# Patient Record
Sex: FEMALE | Race: WHITE | NOT HISPANIC OR LATINO | Employment: OTHER | URBAN - METROPOLITAN AREA
[De-identification: names, ages, dates, MRNs, and addresses within clinical notes are randomized per-mention and may not be internally consistent; named-entity substitution may affect disease eponyms.]

---

## 2017-03-01 ENCOUNTER — ALLSCRIPTS OFFICE VISIT (OUTPATIENT)
Dept: OTHER | Facility: OTHER | Age: 77
End: 2017-03-01

## 2017-09-11 ENCOUNTER — ALLSCRIPTS OFFICE VISIT (OUTPATIENT)
Dept: OTHER | Facility: OTHER | Age: 77
End: 2017-09-11

## 2017-12-08 ENCOUNTER — GENERIC CONVERSION - ENCOUNTER (OUTPATIENT)
Dept: CARDIOLOGY CLINIC | Facility: CLINIC | Age: 77
End: 2017-12-08

## 2018-01-14 VITALS
SYSTOLIC BLOOD PRESSURE: 120 MMHG | WEIGHT: 214 LBS | HEART RATE: 92 BPM | DIASTOLIC BLOOD PRESSURE: 80 MMHG | BODY MASS INDEX: 33.59 KG/M2 | OXYGEN SATURATION: 94 % | HEIGHT: 67 IN

## 2018-01-14 VITALS
DIASTOLIC BLOOD PRESSURE: 80 MMHG | SYSTOLIC BLOOD PRESSURE: 142 MMHG | OXYGEN SATURATION: 97 % | HEART RATE: 84 BPM | BODY MASS INDEX: 33.59 KG/M2 | HEIGHT: 67 IN | WEIGHT: 214 LBS

## 2018-05-18 RX ORDER — ASPIRIN 81 MG/1
TABLET ORAL DAILY
COMMUNITY
Start: 2014-06-13

## 2018-05-18 RX ORDER — OXYBUTYNIN CHLORIDE 5 MG/1
5 TABLET ORAL 3 TIMES DAILY
Refills: 11 | COMMUNITY
Start: 2018-04-21

## 2018-05-18 RX ORDER — AMLODIPINE BESYLATE 5 MG/1
5 TABLET ORAL DAILY
Refills: 1 | COMMUNITY
Start: 2018-04-23

## 2018-05-18 RX ORDER — PRAVASTATIN SODIUM 20 MG
20 TABLET ORAL DAILY
Refills: 0 | COMMUNITY
Start: 2018-05-09 | End: 2021-11-02 | Stop reason: SDUPTHER

## 2018-05-18 RX ORDER — LEVETIRACETAM 500 MG/1
500 TABLET ORAL 2 TIMES DAILY
Refills: 5 | COMMUNITY
Start: 2018-05-09

## 2018-05-18 RX ORDER — LEVOTHYROXINE SODIUM 0.1 MG/1
TABLET ORAL
Refills: 1 | COMMUNITY
Start: 2018-05-08

## 2018-05-21 ENCOUNTER — OFFICE VISIT (OUTPATIENT)
Dept: CARDIOLOGY CLINIC | Facility: CLINIC | Age: 78
End: 2018-05-21
Payer: MEDICARE

## 2018-05-21 VITALS
HEIGHT: 67 IN | BODY MASS INDEX: 34.91 KG/M2 | OXYGEN SATURATION: 95 % | HEART RATE: 92 BPM | SYSTOLIC BLOOD PRESSURE: 122 MMHG | WEIGHT: 222.4 LBS | DIASTOLIC BLOOD PRESSURE: 84 MMHG

## 2018-05-21 DIAGNOSIS — I10 HYPERTENSION, ESSENTIAL: ICD-10-CM

## 2018-05-21 DIAGNOSIS — E78.2 HYPERLIPEMIA, MIXED: ICD-10-CM

## 2018-05-21 DIAGNOSIS — I25.10 CORONARY ARTERY DISEASE INVOLVING NATIVE CORONARY ARTERY OF NATIVE HEART WITHOUT ANGINA PECTORIS: Primary | ICD-10-CM

## 2018-05-21 DIAGNOSIS — R06.00 DYSPNEA ON EFFORT: ICD-10-CM

## 2018-05-21 DIAGNOSIS — E66.09 CLASS 1 OBESITY DUE TO EXCESS CALORIES WITHOUT SERIOUS COMORBIDITY WITH BODY MASS INDEX (BMI) OF 34.0 TO 34.9 IN ADULT: ICD-10-CM

## 2018-05-21 PROBLEM — R06.09 DYSPNEA ON EFFORT: Status: ACTIVE | Noted: 2018-05-21

## 2018-05-21 PROCEDURE — 99214 OFFICE O/P EST MOD 30 MIN: CPT | Performed by: INTERNAL MEDICINE

## 2018-05-21 RX ORDER — NABUMETONE 500 MG/1
500 TABLET, FILM COATED ORAL 2 TIMES DAILY
COMMUNITY

## 2018-05-21 NOTE — PROGRESS NOTES
[facility] [place]  [address] [facility]  [address]  Cardiology Follow Up    Camilla Marroquin  1940  [id number]      1  Coronary artery disease involving native coronary artery of native heart without angina pectoris  Echo complete with contrast if indicated    NM myocardial perfusion spect (rx stress and/or rest)   2  Hypertension, essential     3  Hyperlipemia, mixed     4  Dyspnea on effort  Echo complete with contrast if indicated    NM myocardial perfusion spect (rx stress and/or rest)   5  Class 1 obesity due to excess calories without serious comorbidity with body mass index (BMI) of 34 0 to 34 9 in adult         Chief Complaint   Patient presents with    Follow-up       Interval History:  Patient presents for follow-up visit  Patient denies any history of chest pain  Patient does have some shortness of breath with exertion which is more than usual   Patient also feels weak and tired  No history of leg edema orthopnea PN D  No history of presyncope syncope  No recent cardiac testing  Last cardiac workup was 2 years ago  She states that she has been compliant with all her present medications  Patient Active Problem List   Diagnosis    Coronary artery disease involving native coronary artery of native heart without angina pectoris    Hypertension, essential    Hyperlipemia, mixed    Dyspnea on effort    Class 1 obesity due to excess calories without serious comorbidity with body mass index (BMI) of 34 0 to 34 9 in adult     Past Medical History:   Diagnosis Date    Acute medial meniscal tear     Bladder cancer ([facility] [address]     Hyperlipidemia     Hypertension     Hypothyroidism     Sciatica     Syncope     Thyroid nodule      Social History     Social History    Marital status:      Spouse name: N/A    Number of children: N/A    Years of education: N/A     Occupational History    Not on file       Social History Main Topics    Smoking status: Former Smoker    Smokeless tobacco: Never Used    Alcohol use Yes      Comment: social    Drug use: Unknown    Sexual activity: Not on file     Other Topics Concern    Not on file     Social History Narrative    No narrative on file      Family History   Problem Relation Age of Onset    Alzheimer's disease Mother     Cancer Mother      malignant neoplasm    Rectal cancer Father      Past Surgical History:   Procedure Laterality Date    TONSILLECTOMY      TOTAL HIP ARTHROPLASTY         Current Outpatient Prescriptions:     amLODIPine (NORVASC) 5 mg tablet, Take 5 mg by mouth daily, Disp: , Rfl: 1    aspirin (ECOTRIN LOW STRENGTH) 81 mg EC tablet, Take by mouth, Disp: , Rfl:     B Complex Vitamins (B COMPLEX 1 PO), Take by mouth, Disp: , Rfl:     levETIRAcetam (KEPPRA) 500 mg tablet, Take 500 mg by mouth 2 (two) times a day, Disp: , Rfl: 5    levothyroxine 100 mcg tablet, TAKE 1 TAB(S) ONCE A DAY ORALLY 90 DAY(S), Disp: , Rfl: 1    Multiple Vitamin (MULTIVITAMINS PO), Take by mouth, Disp: , Rfl:     nabumetone (RELAFEN) 500 mg tablet, Take 500 mg by mouth 2 (two) times a day, Disp: , Rfl:     oxybutynin (DITROPAN) 5 mg tablet, Take 5 mg by mouth 2 (two) times a day, Disp: , Rfl: 11    pravastatin (PRAVACHOL) 20 mg tablet, Take 20 mg by mouth daily, Disp: , Rfl: 0  Allergies   Allergen Reactions    Sulfamethoxazole-Trimethoprim Rash    Sulfa Antibiotics        Labs:  No visits with results within 2 Month(s) from this visit  Latest known visit with results is:   No results found for any previous visit  Imaging: No results found      Review of Systems:  Review of Systems   REVIEW OF SYSTEMS:  Constitutional:  Denies fever or chills   Eyes:  Denies change in visual acuity   HENT:  Denies nasal congestion or sore throat   Respiratory:  shortness of breath   Cardiovascular:  Denies chest pain or edema   GI:  Denies abdominal pain, nausea, vomiting, bloody stools or diarrhea   :  Denies dysuria, frequency, difficulty in micturition and nocturia  Musculoskeletal:  Denies back pain or joint pain   Neurologic:  Denies headache, focal weakness or sensory changes   Endocrine:  Denies polyuria or polydipsia   Lymphatic:  Denies swollen glands   Psychiatric:  Denies depression or anxiety     Physical Exam:    /84   Pulse 92   Ht 5' 7" (1 702 m)   Wt 101 kg (222 lb 6 4 oz)   SpO2 95%   BMI 34 83 kg/m²     Physical Exam   PHYSICAL EXAM:  General:  Patient is not in acute distress   Head: Normocephalic, Atraumatic  HEENT:  Both pupils normal-size atraumatic, normocephalic, nonicteric  Neck:  JVP not raised  Trachea central  No carotid bruit  Respiratory:  Decreased breath sounds  Cardiovascular:  Regular rate and rhythm no S3 no murmurs  GI:  Abdomen soft nontender  No organomegaly  Lymphatic:  No cervical or inguinal lymphadenopathy  Neurologic:  Patient is awake alert, oriented   Grossly nonfocal    Discussion/Summary:  Patient with known history of coronary artery disease in the past with the multiple risk factors for coronary artery disease including hypertension hyperlipidemia advanced age and obesity  Patient has symptoms of shortness of breath  Patient will be scheduled for an echocardiogram to evaluate ejection fraction valves and look for evidence of pulmonary hypertension  Patient will also be scheduled for a pharmacological nuclear stress test to assess for ischemia  Patient is unable to exercise on the treadmill secondary to significant shortness of breath with minimal exertion  All her medications were reviewed  Symptoms to watch out from cardiac standpoint which would indicate the need for further cardiac evaluation including cardiac catheterization discussed with patient  Follow-up in 3 months or earlier as needed  Follow-up with primary care physician  Patient had blood work through her primary care physician  Patient had a few questions which were answered

## 2018-07-27 ENCOUNTER — HOSPITAL ENCOUNTER (OUTPATIENT)
Dept: NON INVASIVE DIAGNOSTICS | Facility: CLINIC | Age: 78
Discharge: HOME | End: 2018-07-27
Payer: MEDICARE

## 2018-07-27 DIAGNOSIS — I25.10 CORONARY ARTERY DISEASE INVOLVING NATIVE CORONARY ARTERY OF NATIVE HEART WITHOUT ANGINA PECTORIS: ICD-10-CM

## 2018-07-27 DIAGNOSIS — R06.00 DYSPNEA ON EFFORT: ICD-10-CM

## 2018-07-27 LAB
MAX DIASTOLIC BP: 94 MMHG
MAX HEART RATE: 106 BPM
MAX PREDICTED HEART RATE: 144 BPM
MAX. SYSTOLIC BP: 146 MMHG
PROTOCOL NAME: NORMAL
REASON FOR TERMINATION: NORMAL
TARGET HR FORMULA: NORMAL
TIME IN EXERCISE PHASE: NORMAL

## 2018-07-27 PROCEDURE — 93306 TTE W/DOPPLER COMPLETE: CPT | Performed by: INTERNAL MEDICINE

## 2018-07-27 PROCEDURE — 78452 HT MUSCLE IMAGE SPECT MULT: CPT

## 2018-07-27 PROCEDURE — A9502 TC99M TETROFOSMIN: HCPCS

## 2018-07-27 PROCEDURE — 93017 CV STRESS TEST TRACING ONLY: CPT

## 2018-07-27 PROCEDURE — 93306 TTE W/DOPPLER COMPLETE: CPT

## 2018-07-27 RX ADMIN — REGADENOSON 0.4 MG: 0.08 INJECTION, SOLUTION INTRAVENOUS at 13:22

## 2018-07-30 ENCOUNTER — TELEPHONE (OUTPATIENT)
Dept: CARDIOLOGY CLINIC | Facility: CLINIC | Age: 78
End: 2018-07-30

## 2018-07-30 NOTE — TELEPHONE ENCOUNTER
----- Message from Niesha Spain PA-C sent at 7/27/2018  4:51 PM EDT -----  pls call pt-- echo overall good

## 2018-08-28 ENCOUNTER — OFFICE VISIT (OUTPATIENT)
Dept: CARDIOLOGY CLINIC | Facility: CLINIC | Age: 78
End: 2018-08-28
Payer: MEDICARE

## 2018-08-28 VITALS
HEIGHT: 67 IN | DIASTOLIC BLOOD PRESSURE: 80 MMHG | OXYGEN SATURATION: 96 % | HEART RATE: 88 BPM | BODY MASS INDEX: 35.53 KG/M2 | WEIGHT: 226.4 LBS | SYSTOLIC BLOOD PRESSURE: 140 MMHG

## 2018-08-28 DIAGNOSIS — I25.10 CORONARY ARTERY DISEASE INVOLVING NATIVE CORONARY ARTERY OF NATIVE HEART WITHOUT ANGINA PECTORIS: Primary | ICD-10-CM

## 2018-08-28 DIAGNOSIS — R06.00 DYSPNEA ON EFFORT: ICD-10-CM

## 2018-08-28 DIAGNOSIS — I10 HYPERTENSION, ESSENTIAL: ICD-10-CM

## 2018-08-28 DIAGNOSIS — E78.2 HYPERLIPEMIA, MIXED: ICD-10-CM

## 2018-08-28 DIAGNOSIS — E66.09 CLASS 1 OBESITY DUE TO EXCESS CALORIES WITHOUT SERIOUS COMORBIDITY WITH BODY MASS INDEX (BMI) OF 34.0 TO 34.9 IN ADULT: ICD-10-CM

## 2018-08-28 PROCEDURE — 99214 OFFICE O/P EST MOD 30 MIN: CPT | Performed by: INTERNAL MEDICINE

## 2018-08-28 NOTE — PROGRESS NOTES
[facility] [place]  [address] [facility]  [address]  Cardiology Follow Up    [place]   1940  [id number]      1  Coronary artery disease involving native coronary artery of native heart without angina pectoris     2  Hypertension, essential     3  Hyperlipemia, mixed     4  Dyspnea on effort     5  Class 1 obesity due to excess calories without serious comorbidity with body mass index (BMI) of 34 0 to 34 9 in adult         Chief Complaint   Patient presents with    Follow-up    Results    Chest Pain     ABout a month or so ago    Migraine       Interval History:   Patient presents for follow-up visit  Patient denies any chest pain related to exertion  Patient does have some chest pain on lying down which could be related to reflux  Patient does have some shortness of breath with exertion which is stable  Does have some leg edema which is stable  No history of orthopnea PND  No history of presyncope syncope  She states that she has been compliant with all her present medications  Patient Active Problem List   Diagnosis    Coronary artery disease involving native coronary artery of native heart without angina pectoris    Hypertension, essential    Hyperlipemia, mixed    Dyspnea on effort    Class 1 obesity due to excess calories without serious comorbidity with body mass index (BMI) of 34 0 to 34 9 in adult     Past Medical History:   Diagnosis Date    Acute medial meniscal tear     Bladder cancer ([facility] [address]     Hyperlipidemia     Hypertension     Hypothyroidism     Sciatica     Syncope     Thyroid nodule      Social History     Social History    Marital status:      Spouse name: N/A    Number of children: N/A    Years of education: N/A     Occupational History    Not on file       Social History Main Topics    Smoking status: Former Smoker    Smokeless tobacco: Never Used    Alcohol use Yes      Comment: social    Drug use: Unknown    Sexual activity: Not on file     Other Topics Concern    Not on file     Social History Narrative    No narrative on file      Family History   Problem Relation Age of Onset    Alzheimer's disease Mother     Cancer Mother         malignant neoplasm    Rectal cancer Father      Past Surgical History:   Procedure Laterality Date    TONSILLECTOMY      TOTAL HIP ARTHROPLASTY         Current Outpatient Prescriptions:     amLODIPine (NORVASC) 5 mg tablet, Take 5 mg by mouth daily, Disp: , Rfl: 1    aspirin (ECOTRIN LOW STRENGTH) 81 mg EC tablet, Take by mouth, Disp: , Rfl:     B Complex Vitamins (B COMPLEX 1 PO), Take by mouth, Disp: , Rfl:     levETIRAcetam (KEPPRA) 500 mg tablet, Take 500 mg by mouth 2 (two) times a day, Disp: , Rfl: 5    levothyroxine 100 mcg tablet, TAKE 1 TAB(S) ONCE A DAY ORALLY 90 DAY(S), Disp: , Rfl: 1    Multiple Vitamin (MULTIVITAMINS PO), Take by mouth, Disp: , Rfl:     nabumetone (RELAFEN) 500 mg tablet, Take 500 mg by mouth 2 (two) times a day, Disp: , Rfl:     oxybutynin (DITROPAN) 5 mg tablet, Take 5 mg by mouth 2 (two) times a day, Disp: , Rfl: 11    pravastatin (PRAVACHOL) 20 mg tablet, Take 20 mg by mouth daily, Disp: , Rfl: 0  Allergies   Allergen Reactions    Sulfamethoxazole-Trimethoprim Rash    Sulfa Antibiotics        Labs:  Hospital Outpatient Visit on 07/27/2018   Component Date Value    Protocol Name 07/27/2018 LEXISCAN-SIT     Time In Exercise Phase 07/27/2018 00:02:00     MAX  SYSTOLIC BP 13/39/8830 787     Max Diastolic Bp 03/86/4500 94     Max Heart Rate 07/27/2018 106     Max Predicted Heart Rate 07/27/2018 144     Reason for Termination 07/27/2018 Protocol Complete     Test Indication 07/27/2018                      Value:CAD  Dyspnea on effort      Target Hr Formular 07/27/2018 (220 - Age)*85%      Imaging: No results found      Review of Systems:  Review of Systems   REVIEW OF SYSTEMS:  Constitutional:  Denies fever or chills   Eyes:  Denies change in visual acuity   HENT:  Denies nasal congestion or sore throat   Respiratory:  Denies cough or shortness of breath   Cardiovascular:   edema   GI:  Denies abdominal pain, nausea, vomiting, bloody stools or diarrhea   :  Denies dysuria, frequency, difficulty in micturition and nocturia  Musculoskeletal:  Denies back pain or joint pain   Neurologic:  Denies headache, focal weakness or sensory changes   Endocrine:  Denies polyuria or polydipsia   Lymphatic:  Denies swollen glands   Psychiatric:  Denies depression or anxiety     Physical Exam:    /80   Pulse 88   Ht 5' 7" (1 702 m)   Wt 103 kg (226 lb 6 4 oz)   SpO2 96%   BMI 35 46 kg/m²     Physical Exam   PHYSICAL EXAM:  General:  Patient is not in acute distress   Head: Normocephalic, Atraumatic  HEENT:  Both pupils normal-size atraumatic, normocephalic, nonicteric  Neck:  JVP not raised  Trachea central  No carotid bruit  Respiratory:  normal breath sounds no crackles  no rhonchi  Cardiovascular:  Regular rate and rhythm no S3 no murmurs  GI:  Abdomen soft nontender  No organomegaly  Lymphatic:  No cervical or inguinal lymphadenopathy  Neurologic:  Patient is awake alert, oriented   Grossly nonfocal  Extremities reveal 1+ edema    Discussion/Summary:  Patient with multiple medical problems who seems to be doing reasonably well from cardiac standpoint  Previous studies reviewed with patient  Medications reviewed and possible side effects discussed  concepts of cardiovascular disease , signs and symptoms of heart disease  Dietary and risk factor modification reinforced  All questions answered  Safety measures reviewed  Patient advised to report any problems prompting medical attention  Results of pharmacological nuclear stress test which was negative for ischemia with normal ejection fraction discussed with the patient  Sensitivity and specificity of stress test also discussed    Symptoms to watch out from cardiac standpoint which would indicate the need for further cardiac evaluation including cardiac catheterization also discussed  Patient will try Pepcid over the counter for chest discomfort on lying down  Patient does have a follow-up appointment with her primary care physician  Previous cardiovascular studies also reviewed with the patient  Patient had a few questions which were answered  Follow-up in 6 months

## 2019-02-11 ENCOUNTER — OFFICE VISIT (OUTPATIENT)
Dept: CARDIOLOGY CLINIC | Facility: CLINIC | Age: 79
End: 2019-02-11
Payer: MEDICARE

## 2019-02-11 VITALS
WEIGHT: 228.2 LBS | HEART RATE: 101 BPM | OXYGEN SATURATION: 97 % | BODY MASS INDEX: 35.82 KG/M2 | DIASTOLIC BLOOD PRESSURE: 82 MMHG | HEIGHT: 67 IN | SYSTOLIC BLOOD PRESSURE: 134 MMHG

## 2019-02-11 DIAGNOSIS — I10 ESSENTIAL HYPERTENSION: ICD-10-CM

## 2019-02-11 DIAGNOSIS — I25.10 CORONARY ARTERY DISEASE INVOLVING NATIVE HEART WITHOUT ANGINA PECTORIS, UNSPECIFIED VESSEL OR LESION TYPE: Primary | ICD-10-CM

## 2019-02-11 DIAGNOSIS — R06.00 DYSPNEA ON EXERTION: ICD-10-CM

## 2019-02-11 DIAGNOSIS — E78.00 PURE HYPERCHOLESTEROLEMIA: ICD-10-CM

## 2019-02-11 DIAGNOSIS — E66.01 CLASS 2 SEVERE OBESITY DUE TO EXCESS CALORIES WITH SERIOUS COMORBIDITY AND BODY MASS INDEX (BMI) OF 35.0 TO 35.9 IN ADULT (HCC): ICD-10-CM

## 2019-02-11 PROCEDURE — 99213 OFFICE O/P EST LOW 20 MIN: CPT | Performed by: INTERNAL MEDICINE

## 2019-02-11 RX ORDER — MECLIZINE HCL 12.5 MG/1
TABLET ORAL 2 TIMES DAILY
COMMUNITY

## 2020-02-04 ENCOUNTER — OFFICE VISIT (OUTPATIENT)
Dept: CARDIOLOGY CLINIC | Facility: CLINIC | Age: 80
End: 2020-02-04
Payer: MEDICARE

## 2020-02-04 VITALS
HEIGHT: 67 IN | DIASTOLIC BLOOD PRESSURE: 74 MMHG | BODY MASS INDEX: 35.63 KG/M2 | WEIGHT: 227 LBS | OXYGEN SATURATION: 96 % | HEART RATE: 90 BPM | SYSTOLIC BLOOD PRESSURE: 126 MMHG

## 2020-02-04 DIAGNOSIS — I25.10 CORONARY ARTERY DISEASE INVOLVING NATIVE HEART WITHOUT ANGINA PECTORIS, UNSPECIFIED VESSEL OR LESION TYPE: Primary | ICD-10-CM

## 2020-02-04 DIAGNOSIS — I10 ESSENTIAL HYPERTENSION: ICD-10-CM

## 2020-02-04 DIAGNOSIS — E78.2 HYPERLIPEMIA, MIXED: ICD-10-CM

## 2020-02-04 PROCEDURE — 99213 OFFICE O/P EST LOW 20 MIN: CPT | Performed by: INTERNAL MEDICINE

## 2020-02-04 NOTE — PROGRESS NOTES
[facility] [address] [address]  Cardiology Follow Up    Tenisha Solerrey  1940  [id number]      1  Coronary artery disease involving native heart without angina pectoris, unspecified vessel or lesion type     2  Essential hypertension     3  Hyperlipemia, mixed         Chief Complaint   Patient presents with    Follow-up    Coronary Artery Disease       Interval History:  Patient presents for follow-up visit  Patient denies any history of chest pain shortness of breath  Patient denies any history of leg edema or orthopnea PND  No history of presyncope syncope  Patient states compliance with the present list of medications  Patient has mild dementia  Patient Active Problem List   Diagnosis    Coronary artery disease involving native coronary artery of native heart without angina pectoris    Hypertension, essential    Hyperlipemia, mixed    Dyspnea on effort    Class 1 obesity due to excess calories without serious comorbidity with body mass index (BMI) of 34 0 to 34 9 in adult     Past Medical History:   Diagnosis Date    Acute medial meniscal tear     Bladder cancer ([facility] [address]     Hyperlipidemia     Hypertension     Hypothyroidism     Sciatica     Syncope     Thyroid nodule      Social History     Socioeconomic History    Marital status:       Spouse name: Not on file    Number of children: Not on file    Years of education: Not on file    Highest education level: Not on file   Occupational History    Not on file   Social Needs    Financial resource strain: Not on file    Food insecurity:     Worry: Not on file     Inability: Not on file    Transportation needs:     Medical: Not on file     Non-medical: Not on file   Tobacco Use    Smoking status: Former Smoker    Smokeless tobacco: Never Used   Substance and Sexual Activity    Alcohol use: Yes     Comment: social    Drug use: Not on file    Sexual activity: Not on file Lifestyle    Physical activity:     Days per week: Not on file     Minutes per session: Not on file    Stress: Not on file   Relationships    Social connections:     Talks on phone: Not on file     Gets together: Not on file     Attends Restorationist service: Not on file     Active member of club or organization: Not on file     Attends meetings of clubs or organizations: Not on file     Relationship status: Not on file    Intimate partner violence:     Fear of current or ex partner: Not on file     Emotionally abused: Not on file     Physically abused: Not on file     Forced sexual activity: Not on file   Other Topics Concern    Not on file   Social History Narrative    Not on file      Family History   Problem Relation Age of Onset    Alzheimer's disease Mother     Cancer Mother         malignant neoplasm    Rectal cancer Father      Past Surgical History:   Procedure Laterality Date    TONSILLECTOMY      TOTAL HIP ARTHROPLASTY         Current Outpatient Medications:     amLODIPine (NORVASC) 5 mg tablet, Take 5 mg by mouth daily, Disp: , Rfl: 1    aspirin (ECOTRIN LOW STRENGTH) 81 mg EC tablet, Take by mouth daily , Disp: , Rfl:     B Complex Vitamins (B COMPLEX 1 PO), Take by mouth daily , Disp: , Rfl:     levETIRAcetam (KEPPRA) 500 mg tablet, Take 500 mg by mouth 2 (two) times a day, Disp: , Rfl: 5    levothyroxine 100 mcg tablet, TAKE 1 TAB(S) ONCE A DAY ORALLY 90 DAY(S), Disp: , Rfl: 1    meclizine (ANTIVERT) 12 5 MG tablet, Take by mouth 2 (two) times a day, Disp: , Rfl:     Multiple Vitamin (MULTIVITAMINS PO), Take by mouth daily , Disp: , Rfl:     nabumetone (RELAFEN) 500 mg tablet, Take 500 mg by mouth 2 (two) times a day, Disp: , Rfl:     oxybutynin (DITROPAN) 5 mg tablet, Take 5 mg by mouth 3 (three) times a day , Disp: , Rfl: 11    pravastatin (PRAVACHOL) 20 mg tablet, Take 20 mg by mouth daily, Disp: , Rfl: 0  Allergies   Allergen Reactions    Sulfamethoxazole-Trimethoprim Rash    Sulfa Antibiotics        Labs:  No visits with results within 2 Month(s) from this visit  Latest known visit with results is:   Hospital Outpatient Visit on 07/27/2018   Component Date Value    Protocol Name 07/27/2018 LEXISCAN-SIT     Time In Exercise Phase 07/27/2018 00:02:00     MAX  SYSTOLIC BP 52/03/8802 885     Max Diastolic Bp 23/84/6033 94     Max Heart Rate 07/27/2018 106     Max Predicted Heart Rate 07/27/2018 144     Reason for Termination 07/27/2018 Protocol Complete     Test Indication 07/27/2018                      Value:CAD  Dyspnea on effort      Target Hr Formular 07/27/2018 (220 - Age)*85%      Imaging: No results found  Review of Systems:  Review of Systems   REVIEW OF SYSTEMS:  Constitutional:  Denies fever or chills   Eyes:  Denies change in visual acuity   HENT:  Denies nasal congestion or sore throat   Respiratory:  Denies cough or shortness of breath   Cardiovascular:  Denies chest pain or edema   GI:  Denies abdominal pain, nausea, vomiting, bloody stools or diarrhea   :  Denies dysuria, frequency, difficulty in micturition and nocturia  Musculoskeletal:  Denies back pain or joint pain   Neurologic:  Memory disturbance  Endocrine:  Denies polyuria or polydipsia   Lymphatic:  Denies swollen glands   Psychiatric:  Denies depression or anxiety     Physical Exam:    /74   Pulse 90   Ht 5' 7" (1 702 m)   Wt 103 kg (227 lb)   SpO2 96%   BMI 35 55 kg/m²     Physical Exam   PHYSICAL EXAM:  General:  Patient is not in acute distress   Head: Normocephalic, Atraumatic  HEENT:  Both pupils normal-size atraumatic, normocephalic, nonicteric  Neck:  JVP not raised  Trachea central  No carotid bruit  Respiratory:  normal breath sounds no crackles  no rhonchi  Cardiovascular:  Regular rate and rhythm no S3 no murmurs  GI:  Abdomen soft nontender  No organomegaly  Lymphatic:  No cervical or inguinal lymphadenopathy  Neurologic:  Patient is awake alert, oriented    Grossly nonfocal    Discussion/Summary:  Patient overall doing well from a cardiovascular standpoint  Continue present medications  Previous cardiovascular studies reviewed  Symptoms to watch out from cardiac standpoint discussed with patient  Patient had blood work through primary care physician  Followup in 6 months or earlier as needed  Follow-up with primary care physician

## 2020-11-17 ENCOUNTER — OFFICE VISIT (OUTPATIENT)
Dept: CARDIOLOGY CLINIC | Facility: CLINIC | Age: 80
End: 2020-11-17
Payer: MEDICARE

## 2020-11-17 VITALS
WEIGHT: 227 LBS | DIASTOLIC BLOOD PRESSURE: 84 MMHG | HEIGHT: 67 IN | BODY MASS INDEX: 35.63 KG/M2 | SYSTOLIC BLOOD PRESSURE: 132 MMHG | HEART RATE: 89 BPM | OXYGEN SATURATION: 98 %

## 2020-11-17 DIAGNOSIS — I10 ESSENTIAL HYPERTENSION: ICD-10-CM

## 2020-11-17 DIAGNOSIS — I25.10 CORONARY ARTERY DISEASE INVOLVING NATIVE HEART WITHOUT ANGINA PECTORIS, UNSPECIFIED VESSEL OR LESION TYPE: Primary | ICD-10-CM

## 2020-11-17 DIAGNOSIS — E78.2 HYPERLIPEMIA, MIXED: ICD-10-CM

## 2020-11-17 PROCEDURE — 99213 OFFICE O/P EST LOW 20 MIN: CPT | Performed by: INTERNAL MEDICINE

## 2021-01-20 DIAGNOSIS — Z23 ENCOUNTER FOR IMMUNIZATION: ICD-10-CM

## 2021-01-25 ENCOUNTER — IMMUNIZATIONS (OUTPATIENT)
Dept: FAMILY MEDICINE CLINIC | Facility: HOSPITAL | Age: 81
End: 2021-01-25

## 2021-01-25 DIAGNOSIS — Z23 ENCOUNTER FOR IMMUNIZATION: Primary | ICD-10-CM

## 2021-01-25 PROCEDURE — 91301 SARS-COV-2 / COVID-19 MRNA VACCINE (MODERNA) 100 MCG: CPT

## 2021-01-25 PROCEDURE — 0011A SARS-COV-2 / COVID-19 MRNA VACCINE (MODERNA) 100 MCG: CPT

## 2021-02-22 ENCOUNTER — IMMUNIZATIONS (OUTPATIENT)
Dept: FAMILY MEDICINE CLINIC | Facility: HOSPITAL | Age: 81
End: 2021-02-22

## 2021-02-22 DIAGNOSIS — Z23 ENCOUNTER FOR IMMUNIZATION: Primary | ICD-10-CM

## 2021-02-22 PROCEDURE — 91301 SARS-COV-2 / COVID-19 MRNA VACCINE (MODERNA) 100 MCG: CPT

## 2021-02-22 PROCEDURE — 0012A SARS-COV-2 / COVID-19 MRNA VACCINE (MODERNA) 100 MCG: CPT

## 2021-07-11 NOTE — RESULT NOTES
Verified Results  NM MYOCARDIAL PERFUSION SPECT (RX STRESS AND/OR REST) [id number] 10:33AM Lianne Henderson Order Number: [id number]     Test Name Result Flag Reference   NM MYOCARDIAL PERFUSION SPECT (RX STRESS AND/OR REST) (Report)     [facility], [address] [place] [address]   [phone]     Rest/Stress Gated SPECT Myocardial Perfusion Imaging After Regadenoson     Patient: Chavo Pemberton   MR number: [id number]   Account number: [de-identified]   : 1940   Age: 78 years   Gender: Female   Status: Outpatient   Location: [facility]   Height: 74 in   Weight: 230 lb   BP: 146/ 84 mmHg     Allergies: NO KNOWN ALLERGIES     Diagnosis: R06 02 - Shortness of breath     Primary Physician: Tri Dean MD   Referring Physician: Amari Rodrigues MD   Technician: Berenice DEJESUS, BA, AART(N)   Group: [facility]   Other: Leeann Baxter MS, CCT   Interpreting Physician: Amari Rodrigues MD     INDICATIONS: Shortness of Breath     HISTORY: Quit smoking 25 years ago  The patient is a 78year old    female  Chest pain status: no chest pain  Other symptoms: dyspnea  Coronary   artery disease risk factors: dyslipidemia, hypertension, and post-menopausal   state  Co-morbidity: obesity  Medications: aspirin, a lipid lowering agent, an   antihypertensive agent, and thyroid medications  REST ECG: Normal sinus rhythm  Low voltage complexes  PROCEDURE: The study was performed at [address] [place] [address]  A   regadenoson infusion pharmacologic stress test was performed  Gated SPECT   myocardial perfusion imaging was performed after stress and at rest  Systolic   blood pressure was 146 mmHg, at the start of the study  Diastolic blood   pressure was 84 mmHg, at the start of the study  The heart rate was 68 bpm, at   the start of the study     Regadenoson protocol:   HR bpm SBP mmHg DBP mmHg   Baseline 68 146 84   Immediate 95 -- --   1 min 98 -- --   2 min 94 146 70   3 min 87 -- --   4 min 84 142 68   No medications or fluids given  STRESS SUMMARY: Duration of pharmacologic stress was 5 min  Maximal heart rate   during stress was 98 bpm  The rate-pressure product for the peak heart rate and   blood pressure was 27919  There was no chest pain during stress  The stress   test was terminated due to protocol completion  The stress ECG was negative for   ischemia  There were no stress arrhythmias or conduction abnormalities  ISOTOPE ADMINISTRATION:   Resting isotope administration Stress isotope administration   Agent Tetrofosmin --   Dose 16 1 mCi 45 mCi   Date 04/06/2016 04/06/2016   Injection time 11:51 13:46   Injection-image interval 45 min 30 min     MYOCARDIAL PERFUSION IMAGING:   The image quality was good  Left ventricular size was normal  The TID ratio was   0 91  PERFUSION DEFECTS:   - There were no perfusion defects  GATED SPECT:   The calculated left ventricular ejection fraction was 71 %  Left ventricular   ejection fraction was within normal limits by visual estimate  There was no   left ventricular regional abnormality  SUMMARY:   - Stress results: There was no chest pain during stress  - ECG conclusions: The stress ECG was negative for ischemia  - Perfusion imaging: There were no perfusion defects    - Gated SPECT: The calculated left ventricular ejection fraction was 71 %  Left ventricular ejection fraction was within normal limits by visual estimate  There was no left ventricular regional abnormality  IMPRESSIONS: Normal study  Myocardial perfusion imaging was normal at rest and   with stress   Left ventricular systolic function was normal      Prepared and signed by     Shruthi Lofton MD   Signed 04/07/2016 17:08:11     ECHO COMPLETE WITH CONTRAST IF INDICATED, TTE / Mike Gracia [id number] 10:33AM Noman Paulson Order Number: [id number]     Test Name Result Flag Reference   ECHO COMPLETE WITH CONTRAST IF INDICATED (Report)     [facility], [address] [facility]   [phone]     Transthoracic Echocardiogram   2D, M-mode, and Color Doppler     Study date: 2016     Patient: Pablo Erazo   MR number: [id number]   Account number: [de-identified]   : 1940   Age: 78 years   Gender: Female   Status: Outpatient   Location: [facility]   Height: 67 in   Weight: 230 lb   BP: 128/ 80 mmHg     Indications: Shortness of Breath  Diagnoses: R06 02 - Shortness of breath     Sonographer: Thao RCS   Primary Physician: Kailey Haque MD   Referring Physician: Vipul Mustafa MD   Group: [facility]   Interpreting Physician: Vipul Mustafa MD     SUMMARY     LEFT VENTRICLE:   Ejection fraction was estimated to be 60 %  There were no regional wall motion abnormalities  There was mild concentric hypertrophy  MITRAL VALVE:   There was mild annular calcification  There was trace regurgitation  PULMONIC VALVE:   There was trace regurgitation  HISTORY: PRIOR HISTORY: Former smoker  Risk factors: hypertension and   medication-treated hypercholesterolemia  PROCEDURE: The study was performed in the [address] [place] [address]  This   was a routine study  The transthoracic approach was used  The study included   complete 2D imaging, M-mode, and color Doppler  The heart rate was 71 bpm, at   the start of the study  Images were obtained from the parasternal, apical,   subcostal, and suprasternal notch acoustic windows  Image quality was adequate  LEFT VENTRICLE: Size was normal  Ejection fraction was estimated to be 60 %  There were no regional wall motion abnormalities  There was mild concentric   hypertrophy   DOPPLER: Left ventricular diastolic function parameters were   normal      RIGHT VENTRICLE: The size was normal  Systolic function was normal  Wall   thickness was 11-Jul-2021 09:00 normal      LEFT ATRIUM: Size was normal      RIGHT ATRIUM: Size was normal      MITRAL VALVE: There was mild annular calcification  Valve structure was normal    There was normal leaflet separation  DOPPLER: The transmitral velocity was   within the normal range  There was no evidence for stenosis  There was trace   regurgitation  AORTIC VALVE: The valve was not well visualized  DOPPLER: There was no evidence   for stenosis  TRICUSPID VALVE: The valve structure was normal  There was normal leaflet   separation  DOPPLER: The transtricuspid velocity was within the normal range  There was no evidence for stenosis  There was no regurgitation  PULMONIC VALVE: Leaflets exhibited normal thickness, no calcification, and   normal cuspal separation  DOPPLER: The transpulmonic velocity was within the   normal range  There was trace regurgitation  PERICARDIUM: There was no pericardial effusion  The pericardium was normal in   appearance  AORTA: The root exhibited normal size  SYSTEM MEASUREMENT TABLES     Apical four chamber   4 chamber Left Atrium Volume Index; Planimetry; End Systole; Apical four   chamber;: 15 15 cm2 Left Ventricular Diastolic Area; Method of Disks, Single   Plane; End Diastole; Apical four chamber;: 27 33 cm2 Left Ventricular Ejection   Fraction; Method of Disks, Single Plane; Apical four chamber;: 63 1 % Left   Ventricular systolic Area; Method of Disks, Single Plane; End Systole; Apical   four chamber;: 15 43 cm2 Right Atrium Systolic Area; Planimetry; End Systole; Apical four chamber;: 10 78 cm2 Right Ventricular Internal Diastolic Dimension; End Diastole; Apical four chamber;: 28 4 mm   TAPSE: 14 4 mm     Unspecified Scan Mode   Aortic Root Diameter; End Systole;: 31 8 mm   Gradient Pressure, Peak; Simplified Bernoulli; Antegrade Flow; Systole;: 9 2   mm[Hg] Gradient pressure, average; Simplified Bernoulli; Antegrade Flow;   Systole;: 5 1 mm[Hg]   Left atrial diameter;  End Diastole;: 40 mm   Cardiac Output; Teichholz; Systole;: 4 03 L/min   Heart rate; Teichholz;: 85 {H  B }/min   Interventricular Septum Diastolic Thickness; Teichholz; End Diastole;: 11 mm   Left Ventricle Internal End Diastolic Dimension; Teichholz;: 39 7 mm   Left Ventricle Internal Systolic Dimension; Teichholz; End Systole;: 24 6 mm   Left Ventricle Mass; Mass AVCube with Teichholz; End Diastole;: 144 g   Left Ventricle Posterior Wall Diastolic Thickness; Teichholz; End Diastole;: 11   mm   Left Ventricular Ejection Fraction; Teichholz;: 68 9 %   Left Ventricular End Diastolic Volume; Teichholz;: 68 8 ml   Left Ventricular End Systolic Volume; Teichholz;: 21 4 ml   Left Ventricular Fractional Shortening;: 38 %   Stroke volume;  Teichholz; Systole;: 47 4 ml   Mitral Valve Area; Area by Pressure Half-Time; Systole;: 4 31 cm2   Mitral Valve E to A Ratio; Systole;: 0 86   Pressure half time; Diastole;: 0 05 s     [address] Accredited [facility]     Prepared and electronically signed by     Miguelina Morton MD   Signed 07-Apr-2016 16:48:25

## 2021-10-29 ENCOUNTER — HOSPITAL ENCOUNTER (OUTPATIENT)
Dept: MRI IMAGING | Facility: HOSPITAL | Age: 81
Discharge: HOME | End: 2021-10-29
Attending: PSYCHIATRY & NEUROLOGY
Payer: COMMERCIAL

## 2021-10-29 DIAGNOSIS — F03.90 SENILE DEMENTIA, UNCOMPLICATED (HCC): ICD-10-CM

## 2021-10-29 PROCEDURE — 70551 MRI BRAIN STEM W/O DYE: CPT

## 2021-11-02 ENCOUNTER — OFFICE VISIT (OUTPATIENT)
Dept: CARDIOLOGY CLINIC | Facility: CLINIC | Age: 81
End: 2021-11-02
Payer: COMMERCIAL

## 2021-11-02 VITALS
DIASTOLIC BLOOD PRESSURE: 74 MMHG | HEIGHT: 67 IN | SYSTOLIC BLOOD PRESSURE: 120 MMHG | OXYGEN SATURATION: 95 % | HEART RATE: 81 BPM | BODY MASS INDEX: 33.27 KG/M2 | WEIGHT: 212 LBS

## 2021-11-02 DIAGNOSIS — E78.2 HYPERLIPEMIA, MIXED: ICD-10-CM

## 2021-11-02 DIAGNOSIS — I25.10 CORONARY ARTERY DISEASE INVOLVING NATIVE HEART WITHOUT ANGINA PECTORIS, UNSPECIFIED VESSEL OR LESION TYPE: Primary | ICD-10-CM

## 2021-11-02 DIAGNOSIS — I10 ESSENTIAL HYPERTENSION: ICD-10-CM

## 2021-11-02 PROCEDURE — 99214 OFFICE O/P EST MOD 30 MIN: CPT | Performed by: INTERNAL MEDICINE

## 2021-11-02 RX ORDER — TRAZODONE HYDROCHLORIDE 50 MG/1
TABLET ORAL
COMMUNITY

## 2021-11-02 RX ORDER — NAPROXEN 500 MG/1
TABLET ORAL
COMMUNITY
Start: 2021-08-25

## 2021-11-02 RX ORDER — METHOCARBAMOL 750 MG/1
TABLET, FILM COATED ORAL
COMMUNITY
Start: 2021-08-25

## 2021-11-02 RX ORDER — DONEPEZIL HYDROCHLORIDE 10 MG/1
TABLET, FILM COATED ORAL
COMMUNITY

## 2021-11-02 RX ORDER — PRAVASTATIN SODIUM 40 MG
40 TABLET ORAL DAILY
Qty: 90 TABLET | Refills: 3 | Status: SHIPPED | OUTPATIENT
Start: 2021-11-02

## 2022-01-21 ENCOUNTER — HOSPITAL ENCOUNTER (OUTPATIENT)
Dept: RADIOLOGY | Facility: HOSPITAL | Age: 82
Discharge: HOME | End: 2022-01-21

## 2022-10-17 DIAGNOSIS — E78.2 HYPERLIPEMIA, MIXED: ICD-10-CM

## 2022-10-17 DIAGNOSIS — I25.10 CORONARY ARTERY DISEASE INVOLVING NATIVE HEART WITHOUT ANGINA PECTORIS, UNSPECIFIED VESSEL OR LESION TYPE: ICD-10-CM

## 2022-10-18 RX ORDER — PRAVASTATIN SODIUM 40 MG
TABLET ORAL
Qty: 90 TABLET | Refills: 3 | Status: SHIPPED | OUTPATIENT
Start: 2022-10-18

## 2022-10-18 NOTE — TELEPHONE ENCOUNTER
Name from pharmacy: PRAVASTATIN SODIUM 40 MG TAB         Will file in chart as: pravastatin (PRAVACHOL) 40 mg tablet    Sig: TAKE 1 TABLET BY MOUTH EVERY DAY    Disp:  90 tablet    Refills:  3    Start: 10/17/2022    Class: Normal    Non-formulary For: Coronary artery disease involving native heart without angina pectoris, unspecified vessel or lesion type; Hyperlipemia, mixed    Last ordered: 11 months ago by Sade Rivero MD Last refill: 9/4/2022    Rx #: [id number]    Cardiovascular:  Antilipid - Statins Failed 10/17/2022 06:56 PM   Protocol Details  Total Cholesterol within 360 days    LDL within 360 days    HDL within 360 days    Triglycerides within 360 days    No hospital admission in the last 30 days    Valid encounter within last 12 months    Pregnancy status within 30 days      To be filled at: [address] [facility], [address] [facility]    Please review and refill  Thanks!